# Patient Record
Sex: MALE | Race: WHITE | NOT HISPANIC OR LATINO | Employment: OTHER | URBAN - METROPOLITAN AREA
[De-identification: names, ages, dates, MRNs, and addresses within clinical notes are randomized per-mention and may not be internally consistent; named-entity substitution may affect disease eponyms.]

---

## 2022-04-27 DIAGNOSIS — D63.1 ANEMIA IN STAGE 5 CHRONIC KIDNEY DISEASE, NOT ON CHRONIC DIALYSIS: ICD-10-CM

## 2022-04-27 DIAGNOSIS — N18.5 ANEMIA IN STAGE 5 CHRONIC KIDNEY DISEASE, NOT ON CHRONIC DIALYSIS: ICD-10-CM

## 2022-05-03 ENCOUNTER — TELEPHONE (OUTPATIENT)
Dept: NEPHROLOGY | Facility: CLINIC | Age: 82
End: 2022-05-03
Payer: MEDICARE

## 2022-05-03 NOTE — TELEPHONE ENCOUNTER
Patients wife was contacted and he had an appt with Dr. Thompson today and would like to have his Kidneys check so he will go and have labs done tomorrow to check on GFR.     ----- Message from Pily Gudino sent at 5/3/2022  3:44 PM CDT -----  Contact: Ronda  Type: Needs Medical Advice    Who Called: Ronda, pt spouse  Best Call Back Number: [phone]  Additional  Information: PT wife would like for Josseline, to give her a call. States that it's very important  Please Advise- Thank you

## 2022-05-04 ENCOUNTER — OFFICE VISIT (OUTPATIENT)
Dept: NEPHROLOGY | Facility: CLINIC | Age: 82
End: 2022-05-04
Payer: MEDICARE

## 2022-05-04 VITALS
BODY MASS INDEX: 25.34 KG/M2 | HEART RATE: 72 BPM | DIASTOLIC BLOOD PRESSURE: 62 MMHG | OXYGEN SATURATION: 96 % | WEIGHT: 177 LBS | SYSTOLIC BLOOD PRESSURE: 152 MMHG | TEMPERATURE: 99 F | HEIGHT: 70 IN

## 2022-05-04 DIAGNOSIS — N25.81 SECONDARY HYPERPARATHYROIDISM OF RENAL ORIGIN: ICD-10-CM

## 2022-05-04 DIAGNOSIS — D63.1 ANEMIA IN STAGE 5 CHRONIC KIDNEY DISEASE, NOT ON CHRONIC DIALYSIS: ICD-10-CM

## 2022-05-04 DIAGNOSIS — N18.5 CKD (CHRONIC KIDNEY DISEASE), SYMPTOM MANAGEMENT ONLY, STAGE 5: Primary | ICD-10-CM

## 2022-05-04 DIAGNOSIS — Z20.822 ENCOUNTER FOR LABORATORY TESTING FOR COVID-19 VIRUS: ICD-10-CM

## 2022-05-04 DIAGNOSIS — I12.9 RENAL HYPERTENSION: ICD-10-CM

## 2022-05-04 DIAGNOSIS — E11.22 TYPE 2 DIABETES MELLITUS WITH STAGE 5 CHRONIC KIDNEY DISEASE NOT ON CHRONIC DIALYSIS, WITHOUT LONG-TERM CURRENT USE OF INSULIN: ICD-10-CM

## 2022-05-04 DIAGNOSIS — N04.9 NEPHROTIC SYNDROME: ICD-10-CM

## 2022-05-04 DIAGNOSIS — N18.5 TYPE 2 DIABETES MELLITUS WITH STAGE 5 CHRONIC KIDNEY DISEASE NOT ON CHRONIC DIALYSIS, WITHOUT LONG-TERM CURRENT USE OF INSULIN: ICD-10-CM

## 2022-05-04 DIAGNOSIS — N18.5 ANEMIA IN STAGE 5 CHRONIC KIDNEY DISEASE, NOT ON CHRONIC DIALYSIS: ICD-10-CM

## 2022-05-04 PROBLEM — E11.29 TYPE 2 DIABETES MELLITUS WITH KIDNEY COMPLICATION, WITHOUT LONG-TERM CURRENT USE OF INSULIN: Status: ACTIVE | Noted: 2022-05-04

## 2022-05-04 PROCEDURE — 99215 PR OFFICE/OUTPT VISIT, EST, LEVL V, 40-54 MIN: ICD-10-PCS | Mod: ,,, | Performed by: INTERNAL MEDICINE

## 2022-05-04 PROCEDURE — 99215 OFFICE O/P EST HI 40 MIN: CPT | Mod: ,,, | Performed by: INTERNAL MEDICINE

## 2022-05-04 RX ORDER — CARVEDILOL 25 MG/1
25 TABLET ORAL 2 TIMES DAILY
COMMUNITY
Start: 2021-12-28 | End: 2022-12-28

## 2022-05-04 RX ORDER — CLOPIDOGREL BISULFATE 75 MG/1
75 TABLET ORAL
COMMUNITY
Start: 2021-10-20

## 2022-05-04 RX ORDER — EPINEPHRINE 0.22MG
AEROSOL WITH ADAPTER (ML) INHALATION
COMMUNITY

## 2022-05-04 RX ORDER — CLONIDINE HYDROCHLORIDE 0.1 MG/1
0.1 TABLET ORAL 3 TIMES DAILY PRN
COMMUNITY
Start: 2021-12-28 | End: 2022-12-28

## 2022-05-04 RX ORDER — ALPHA LIPOIC ACID 300 MG
2 CAPSULE ORAL DAILY
COMMUNITY

## 2022-05-04 RX ORDER — FINASTERIDE 5 MG/1
5 TABLET, FILM COATED ORAL
COMMUNITY

## 2022-05-04 RX ORDER — ASPIRIN 81 MG/1
81 TABLET ORAL
COMMUNITY

## 2022-05-04 RX ORDER — DARBEPOETIN ALFA 60 UG/ML
60 SOLUTION INTRAVENOUS; SUBCUTANEOUS
COMMUNITY
Start: 2022-03-31

## 2022-05-04 RX ORDER — ACETAMINOPHEN 500 MG
1 TABLET ORAL DAILY
COMMUNITY

## 2022-05-04 RX ORDER — ISOSORBIDE MONONITRATE 60 MG/1
60 TABLET, EXTENDED RELEASE ORAL DAILY
COMMUNITY
Start: 2022-05-03 | End: 2023-04-28

## 2022-05-04 RX ORDER — CHOLECALCIFEROL (VITAMIN D3) 125 MCG
5000 CAPSULE ORAL
COMMUNITY

## 2022-05-04 RX ORDER — MAGNESIUM GLUCONATE 27.5 (500)
1 TABLET ORAL DAILY
COMMUNITY

## 2022-05-04 RX ORDER — FUROSEMIDE 40 MG/1
40 TABLET ORAL DAILY
COMMUNITY
Start: 2022-01-31

## 2022-05-04 RX ORDER — MULTIVITAMIN
1 TABLET ORAL DAILY
COMMUNITY

## 2022-05-04 RX ORDER — AMLODIPINE AND BENAZEPRIL HYDROCHLORIDE 10; 20 MG/1; MG/1
1 CAPSULE ORAL DAILY
COMMUNITY
Start: 2021-09-16

## 2022-05-04 RX ORDER — CARVEDILOL 6.25 MG/1
6.25 TABLET ORAL
COMMUNITY
End: 2022-05-04 | Stop reason: DRUGHIGH

## 2022-05-04 RX ORDER — TIZANIDINE 4 MG/1
4 TABLET ORAL
COMMUNITY
End: 2022-05-04 | Stop reason: ALTCHOICE

## 2022-05-04 NOTE — PROGRESS NOTES
Pt Name:  Gurmeet Nelson  Pt :  1940  Pt MRN:  [id number]    Date: 2022    Reason for visit:     Follow up visit for Chronic Kidney Disease.    Serum creatinine 5.04, GFR 10, CKD stage 5.    Chief Complaint:     The patient denies any complaints today.      Assessment & Plan:      Problem #1. Type 2 Diabetes Mellitus    Assessment:     Controlled with A1c 5.3%   Plan:    Diabetes managements and nutritional support as per Dr. Erick Galloway.    Repeat A1c from time to time as indicated.     Problem #2. Primary Hypertension    Assessment:    Controlled   Plan:    2 gm sodium diet, amlodipine-benazepril 10-20 mg daily, carvedilol 25 mg twice daily, clonidine 0.1 mg twice daily, furosemide 40 mg twice daily, isosorbide mononitrate 60 mg daily.     Problem #3. Chronic Kidney Disease Stage 5    Assessment:   With the need to start dialysis. The clopidogrel and aspirin will not allow a PD catheter placement at this time, even though that was his preferred modality.       Plan:    Chest X-ray, COVID 19 testing, Hepatitis panel    Refer to [facility] dialysis center     Problem #4. Anemia in Chronic Kidney Disease    Assessment:     Complicated by iron deficiency   Plan:    Continue synthetic erythropoietin     Iron load with iron sucrose 100 mg x 10 dialysis treatments after starting dialysis     Problem #5. Secondary Hyperthyroidism of Chronic Kidney Disease    Assessment:     Needful of continued address   Plan:    Repeat calcium, phosphorus, albumin, PTH level from time to time as indicated.     Problem #6. Hyperkalemia    Assessment:     Treated   Plan:   Begin hemodialysis     Repeat potassium level as indicated         HPI:     This is the [facility]st [facility] visit, with this provider, for this 81 year old man referred by Dr. Galloway on 2017 for further evaluation of Chronic Kidney Disease Stage 4.    He has recently begun to experience increasing  dyspnea with minimal exertion, and a sensation of chest heaviness for which he had the dose of the isosorbide uintamononitrate increased by Dr. Thompson yesterday to 60 mg.    He and his wife were planning to [facility] [place], but thought that he needed to be seen today for an assessment before so doing.     In the clinic today for follow up of the status of his kidney function, he has less energy, is more dyspneic with less exertion, has the aforementioned chest heaviness, and continues significant edema requiring wound wraps. He does not added of absent appetite, nausea, shortness of breath awakening him from sleep at night, absent energy, or difficulty with thought formulation or expression. He continues to have some edema.          From the standpoint of risk factors for the development of kidney disease, he has had his Type 2 diabetes mellitus for at least 17 years, if not more.  That he is aware, he has probably had his high blood pressure for at least that long as well.  That he is aware, there is also no significant family history of kidney problems    History:     Past Medical History:   Diagnosis Date    Chronic kidney disease (CKD)     Coronary artery disease     Essential (primary) hypertension     Kidney disease due to secondary diabetes mellitus     Type 2 diabetes mellitus without complications      Past Surgical History:   Procedure Laterality Date    AV FISTULA PLACEMENT Right 05/10/2021    CARDIAC PACEMAKER PLACEMENT      COLONOSCOPY      CORONARY ANGIOPLASTY WITH STENT PLACEMENT       Family History   Problem Relation Age of Onset    Cancer Mother     Heart disease Father     Heart disease Sister     Heart disease Brother      Social History     Substance and Sexual Activity   Alcohol Use Not Currently     Social History     Substance and Sexual Activity   Drug Use Never     Social History     Substance and Sexual Activity   Sexual Activity Not on file      has no history on file for sexual activity.  Social History     Tobacco Use   Smoking Status Never Smoker   Smokeless Tobacco Never Used       Allergies:    Review of patient's allergies indicates:  No Known Allergies      Current Outpatient Medications:     alpha lipoic acid 300 mg Cap, Take 2 capsules by mouth once daily., Disp: , Rfl:     amlodipine-benazepril 10-20mg (LOTREL) 10-20 mg per capsule, Take 1 capsule by mouth once daily., Disp: , Rfl:     aspirin (ECOTRIN) 81 MG EC tablet, Take 81 mg by mouth., Disp: , Rfl:     carvediloL (COREG) 25 MG tablet, Take 25 mg by mouth 2 (two) times a day., Disp: , Rfl:     cholecalciferol, vitamin D3, 125 mcg (5,000 unit) capsule, Take 5,000 Units by mouth., Disp: , Rfl:     cloNIDine (CATAPRES) 0.1 MG tablet, Take 0.1 mg by mouth 3 (three) times daily as needed., Disp: , Rfl:     clopidogreL (PLAVIX) 75 mg tablet, Take 75 mg by mouth., Disp: , Rfl:     coenzyme Q10 100 mg capsule, Take by mouth., Disp: , Rfl:     darbepoetin nazario in polysorbat (ARANESP, IN POLYSORBATE,) 60 mcg/mL Soln, Inject 60 mcg into the skin., Disp: , Rfl:     ferrous sulfate 27 mg iron Tab, Take 27 mg by mouth 2 (two) times daily., Disp: , Rfl:     finasteride (PROSCAR) 5 mg tablet, Take 5 mg by mouth., Disp: , Rfl:     furosemide (LASIX) 40 MG tablet, Take 40 mg by mouth once daily., Disp: , Rfl:     isosorbide mononitrate (IMDUR) 60 MG 24 hr tablet, Take 60 mg by mouth once daily., Disp: , Rfl:     krill-om-3-dha-epa-phospho-ast 460-716-92-64 mg Cap, Take 1 capsule by mouth once daily., Disp: , Rfl:     levocarnitine HCl (ACETYL-L-CARNITINE MISC), 1,000 mg by Misc.(Non-Drug; Combo Route) route., Disp: , Rfl:     magnesium gluconate 27.5 mg magne- sium (500 mg) Tab, Take 1 tablet by mouth once daily., Disp: , Rfl:     multivitamin (THERAGRAN) per tablet, Take 1 tablet by mouth once daily., Disp: , Rfl:     sodium zirconium cyclosilicate (LOKELMA) 10 gram packet, Take 10 g by  "mouth every other day., Disp: , Rfl:     tiZANidine (ZANAFLEX) 4 MG tablet, Take 4 mg by mouth., Disp: , Rfl:     ROS:     Constitutional:  Denies fever or chills   Eyes:  Denies change in visual acuity   HENT:  Denies nasal congestion or sore throat   Respiratory:  As in the history of the present illness.   Cardiovascular:  As in the history of the present illness.   GI:  As in the history of the present illness.    Musculoskeletal:  Denies back pain or joint pain   Integument:  Denies rash   Neurologic:  Denies headache, focal weakness or sensory changes   Endocrine:  Denies polyuria or polydipsia   Lymphatic:  Denies swollen glands   Psychiatric:  Denies depression or anxiety    Physical Exam:     Vitals:   Vitals:    05/04/22 1341   BP: (!) 152/62   Pulse: 72   Temp: 98.9 °F (37.2 °C)   SpO2: 96%   Weight: 80.3 kg (177 lb)   Height: 5' 10" (1.778 m)     Body mass index is 25.4 kg/m².    Constitutional:  Well developed, well nourished, and in no acute distress   Eyes:  PERRLA, conjunctiva pale   HENT:  Atraumatic, external ears normal, nose normal.  Neck: There is no jugular venous distension or thyromegaly.   Respiratory:  No respiratory distress, normal breath sounds, no rales, no wheezing   Cardiovascular:  Normal rate,and a regular rhythm, 1-2/6 JESUS at the apex, no gallops, no rub, and 1 + pretibial edema bilaterally despite wound wraps  GI:  Normal bowel sounds.  Musculoskeletal:  No deformities.   Neurologic:  Alert & oriented x 3, CN 2-12 normal, normal motor function, and no asterixis.   Psychiatric:  Speech and behavior appropriate.      Labs/Tests:    Urine Protein/Creatinine Ratio <0.2 mg of protein/mg of creatinine 5.6 High      Ferritin 10.5 - 307.3 ng/mL 126.1     Component Ref Range & Units 08:53 3 mo ago   Iron 50 - 175 ug/dL 32 Low   31 Low     TIBC 250 - 425 ug/dL 235 Low   245 Low     Iron Saturation 15 - 20 % 14 Low   13 Low                Hemoglobin 11.9 - 16.8 g/dL 7.9 Low      PTH, Intact " 15 - 65 pg/mL 309 High      odium 136 - 147 mmol/L 139    Potassium 3.5 - 5.1 mmol/L 4.6    Chloride 98 - 107 mmol/L 109 High     CO2 20 - 31 mmol/L 19 Low     Glucose 70 - 99 mg/dL 141 High     BUN 9 - 23 mg/dL 72 High     Creatinine 0.70 - 1.30 mg/dL 5.04 High     Calcium 8.3 - 10.6 mg/dL 9.1    Phosphorus Level 2.4 - 5.1 mg/dL 5.0    Albumin Level 3.2 - 4.8 g/dL 3.8    eGFR >90 mL/min/1.73m2 10 Low            Follow up:    In the [facility] [place].         This note was created using the voice recognition software currently available to the Medical Staff of the [facility] and its health care facilities. All of the best efforts undertaken to edit the product of that use shall necessarily fall short from time to time. Viewers and reviewers of the product of its use are encouraged to contact this provider for clarification when, and if, the product message is unclear.

## 2022-05-04 NOTE — PATIENT INSTRUCTIONS
The patient has been provided with their current level of kidney function including eGFR and creatinine.  Preparations for his starting dialysis tomorrow at the [facility] in [place] are underway.

## 2022-05-05 ENCOUNTER — OUTSIDE PLACE OF SERVICE (OUTPATIENT)
Dept: NEPHROLOGY | Facility: CLINIC | Age: 82
End: 2022-05-05
Payer: MEDICARE

## 2022-05-05 DIAGNOSIS — Z11.59 NEED FOR HEPATITIS B SCREENING TEST: Primary | ICD-10-CM

## 2022-05-10 ENCOUNTER — OUTSIDE PLACE OF SERVICE (OUTPATIENT)
Dept: NEPHROLOGY | Facility: CLINIC | Age: 82
End: 2022-05-10
Payer: MEDICARE

## 2022-05-13 ENCOUNTER — TELEPHONE (OUTPATIENT)
Dept: NEPHROLOGY | Facility: CLINIC | Age: 82
End: 2022-05-13
Payer: MEDICARE

## 2022-05-13 NOTE — TELEPHONE ENCOUNTER
TC to patients wife and she states she spoke to Dr. Thompson's office and they gave her a verbal approval to proceed with being able to hold the Plavix in order to get a PD cath placed.  Originally patient was told he had to wait six months after his heart stents being on Plavix before he could be able to hold the Plavix for PD cath placement.  Stents were placed 12/14/21.  Patients wife has scheduled an appointment with Dr. Hicks for PD cath placement evaluation on 5/27/22.  I explained to her that we will need a clearance in writing from Dr. Thompson's office.  She voices understanding.     Dr. Thompson's office contacted and spoke with Eri, his nurse.  She will fax to our office clearance to proceed with PD cath prior to full 6 month time frame.     [facility] PD dept. Made aware.                                     ----- Message from Nisha Bee RN sent at 5/12/2022 11:52 AM CDT -----  Contact: pt wife    ----- Message -----  From: Jackie Bagley  Sent: 5/12/2022  11:06 AM CDT  To: [facility] Staff    Type: Needs Medical Advice    Who Called: pt wife   Best Call Back Number: [phone]  Inquiry/Question: calling to speak with provider about issues with dialysis, please advise pt  Thank you~

## 2022-05-17 ENCOUNTER — OUTSIDE PLACE OF SERVICE (OUTPATIENT)
Dept: NEPHROLOGY | Facility: CLINIC | Age: 82
End: 2022-05-17
Payer: MEDICARE

## 2022-05-24 ENCOUNTER — OUTSIDE PLACE OF SERVICE (OUTPATIENT)
Dept: NEPHROLOGY | Facility: CLINIC | Age: 82
End: 2022-05-24

## 2022-06-01 ENCOUNTER — OUTSIDE PLACE OF SERVICE (OUTPATIENT)
Dept: NEPHROLOGY | Facility: CLINIC | Age: 82
End: 2022-06-01
Payer: MEDICARE

## 2022-06-01 PROCEDURE — 90960 PR ESRD SERVICES, PER MONTH, 20+ YR OLD, 4+ VISITS: ICD-10-PCS | Mod: ,,, | Performed by: INTERNAL MEDICINE

## 2022-06-01 PROCEDURE — 90960 ESRD SRV 4 VISITS P MO 20+: CPT | Mod: ,,, | Performed by: INTERNAL MEDICINE

## 2022-07-01 ENCOUNTER — OUTSIDE PLACE OF SERVICE (OUTPATIENT)
Dept: NEPHROLOGY | Facility: CLINIC | Age: 82
End: 2022-07-01
Payer: MEDICARE

## 2022-07-01 PROCEDURE — 90961 PR ESRD SERVICES, PER MONTH, 20+ YR OLD, 2-3 VISITS: ICD-10-PCS | Mod: ,,, | Performed by: INTERNAL MEDICINE

## 2022-07-01 PROCEDURE — 90961 ESRD SRV 2-3 VSTS P MO 20+: CPT | Mod: ,,, | Performed by: INTERNAL MEDICINE

## 2022-08-01 ENCOUNTER — OUTSIDE PLACE OF SERVICE (OUTPATIENT)
Dept: NEPHROLOGY | Facility: CLINIC | Age: 82
End: 2022-08-01
Payer: MEDICARE

## 2022-08-01 PROCEDURE — 90966 ESRD HOME PT SERV P MO 20+: CPT | Mod: ,,, | Performed by: INTERNAL MEDICINE

## 2022-08-01 PROCEDURE — 90966 PR ESRD SERVICES, HOME DIALYSIS, PER MONTH, 20+ YR OLD: ICD-10-PCS | Mod: ,,, | Performed by: INTERNAL MEDICINE

## 2022-10-01 ENCOUNTER — OUTSIDE PLACE OF SERVICE (OUTPATIENT)
Dept: NEPHROLOGY | Facility: CLINIC | Age: 82
End: 2022-10-01
Payer: MEDICARE

## 2022-10-01 PROCEDURE — 90966 PR ESRD SERVICES, HOME DIALYSIS, PER MONTH, 20+ YR OLD: ICD-10-PCS | Mod: ,,, | Performed by: INTERNAL MEDICINE

## 2022-10-01 PROCEDURE — 90966 ESRD HOME PT SERV P MO 20+: CPT | Mod: ,,, | Performed by: INTERNAL MEDICINE

## 2022-11-01 ENCOUNTER — OUTSIDE PLACE OF SERVICE (OUTPATIENT)
Dept: NEPHROLOGY | Facility: CLINIC | Age: 82
End: 2022-11-01
Payer: MEDICARE

## 2022-11-01 PROCEDURE — 90966 PR ESRD SERVICES, HOME DIALYSIS, PER MONTH, 20+ YR OLD: ICD-10-PCS | Mod: ,,, | Performed by: INTERNAL MEDICINE

## 2022-11-01 PROCEDURE — 90966 ESRD HOME PT SERV P MO 20+: CPT | Mod: ,,, | Performed by: INTERNAL MEDICINE

## 2022-12-01 ENCOUNTER — OUTSIDE PLACE OF SERVICE (OUTPATIENT)
Dept: NEPHROLOGY | Facility: CLINIC | Age: 82
End: 2022-12-01
Payer: MEDICARE

## 2022-12-01 PROCEDURE — 90966 ESRD HOME PT SERV P MO 20+: CPT | Mod: ,,, | Performed by: INTERNAL MEDICINE

## 2022-12-01 PROCEDURE — 90966 PR ESRD SERVICES, HOME DIALYSIS, PER MONTH, 20+ YR OLD: ICD-10-PCS | Mod: ,,, | Performed by: INTERNAL MEDICINE

## 2023-01-01 ENCOUNTER — OUTSIDE PLACE OF SERVICE (OUTPATIENT)
Dept: NEPHROLOGY | Facility: CLINIC | Age: 83
End: 2023-01-01
Payer: MEDICARE

## 2023-01-01 PROCEDURE — 90966 ESRD HOME PT SERV P MO 20+: CPT | Mod: ,,, | Performed by: INTERNAL MEDICINE

## 2023-01-01 PROCEDURE — 90966 PR ESRD SERVICES, HOME DIALYSIS, PER MONTH, 20+ YR OLD: ICD-10-PCS | Mod: ,,, | Performed by: INTERNAL MEDICINE

## 2023-02-01 ENCOUNTER — OUTSIDE PLACE OF SERVICE (OUTPATIENT)
Dept: NEPHROLOGY | Facility: CLINIC | Age: 83
End: 2023-02-01
Payer: MEDICARE

## 2023-02-01 PROCEDURE — 90966 ESRD HOME PT SERV P MO 20+: CPT | Mod: ,,, | Performed by: INTERNAL MEDICINE

## 2023-02-01 PROCEDURE — 90966 PR ESRD SERVICES, HOME DIALYSIS, PER MONTH, 20+ YR OLD: ICD-10-PCS | Mod: ,,, | Performed by: INTERNAL MEDICINE

## 2023-03-01 ENCOUNTER — OUTSIDE PLACE OF SERVICE (OUTPATIENT)
Dept: NEPHROLOGY | Facility: CLINIC | Age: 83
End: 2023-03-01
Payer: MEDICARE

## 2023-03-01 PROCEDURE — 90966 ESRD HOME PT SERV P MO 20+: CPT | Mod: ,,, | Performed by: INTERNAL MEDICINE

## 2023-03-01 PROCEDURE — 90966 PR ESRD SERVICES, HOME DIALYSIS, PER MONTH, 20+ YR OLD: ICD-10-PCS | Mod: ,,, | Performed by: INTERNAL MEDICINE

## 2023-04-01 ENCOUNTER — OUTSIDE PLACE OF SERVICE (OUTPATIENT)
Dept: NEPHROLOGY | Facility: CLINIC | Age: 83
End: 2023-04-01
Payer: MEDICARE

## 2023-04-01 PROCEDURE — 90966 PR ESRD SERVICES, HOME DIALYSIS, PER MONTH, 20+ YR OLD: ICD-10-PCS | Mod: ,,, | Performed by: INTERNAL MEDICINE

## 2023-04-01 PROCEDURE — 90966 ESRD HOME PT SERV P MO 20+: CPT | Mod: ,,, | Performed by: INTERNAL MEDICINE

## 2023-05-01 ENCOUNTER — OUTSIDE PLACE OF SERVICE (OUTPATIENT)
Dept: NEPHROLOGY | Facility: CLINIC | Age: 83
End: 2023-05-01
Payer: MEDICARE

## 2023-05-01 PROCEDURE — 90966 ESRD HOME PT SERV P MO 20+: CPT | Mod: ,,, | Performed by: INTERNAL MEDICINE

## 2023-05-01 PROCEDURE — 90966 PR ESRD SERVICES, HOME DIALYSIS, PER MONTH, 20+ YR OLD: ICD-10-PCS | Mod: ,,, | Performed by: INTERNAL MEDICINE

## 2023-06-01 ENCOUNTER — OUTSIDE PLACE OF SERVICE (OUTPATIENT)
Dept: NEPHROLOGY | Facility: CLINIC | Age: 83
End: 2023-06-01
Payer: MEDICARE

## 2023-06-01 PROCEDURE — 90966 ESRD HOME PT SERV P MO 20+: CPT | Mod: ,,, | Performed by: INTERNAL MEDICINE

## 2023-06-01 PROCEDURE — 90966 PR ESRD SERVICES, HOME DIALYSIS, PER MONTH, 20+ YR OLD: ICD-10-PCS | Mod: ,,, | Performed by: INTERNAL MEDICINE

## 2023-07-01 ENCOUNTER — OUTSIDE PLACE OF SERVICE (OUTPATIENT)
Dept: NEPHROLOGY | Facility: CLINIC | Age: 83
End: 2023-07-01
Payer: MEDICARE

## 2023-07-01 PROCEDURE — 90966 PR ESRD SERVICES, HOME DIALYSIS, PER MONTH, 20+ YR OLD: ICD-10-PCS | Mod: ,,, | Performed by: INTERNAL MEDICINE

## 2023-07-01 PROCEDURE — 90966 ESRD HOME PT SERV P MO 20+: CPT | Mod: ,,, | Performed by: INTERNAL MEDICINE

## 2023-08-01 ENCOUNTER — OUTSIDE PLACE OF SERVICE (OUTPATIENT)
Dept: NEPHROLOGY | Facility: CLINIC | Age: 83
End: 2023-08-01
Payer: MEDICARE

## 2023-08-01 PROCEDURE — 90966 ESRD HOME PT SERV P MO 20+: CPT | Mod: ,,, | Performed by: INTERNAL MEDICINE

## 2023-08-01 PROCEDURE — 90966 PR ESRD SERVICES, HOME DIALYSIS, PER MONTH, 20+ YR OLD: ICD-10-PCS | Mod: ,,, | Performed by: INTERNAL MEDICINE

## 2023-09-01 ENCOUNTER — OUTSIDE PLACE OF SERVICE (OUTPATIENT)
Dept: NEPHROLOGY | Facility: CLINIC | Age: 83
End: 2023-09-01
Payer: MEDICARE

## 2023-09-01 PROCEDURE — 90966 ESRD HOME PT SERV P MO 20+: CPT | Mod: ,,, | Performed by: INTERNAL MEDICINE

## 2023-09-01 PROCEDURE — 90966 PR ESRD SERVICES, HOME DIALYSIS, PER MONTH, 20+ YR OLD: ICD-10-PCS | Mod: ,,, | Performed by: INTERNAL MEDICINE

## 2023-10-01 ENCOUNTER — OUTSIDE PLACE OF SERVICE (OUTPATIENT)
Dept: NEPHROLOGY | Facility: CLINIC | Age: 83
End: 2023-10-01
Payer: MEDICARE

## 2023-10-01 PROCEDURE — 90966 PR ESRD SERVICES, HOME DIALYSIS, PER MONTH, 20+ YR OLD: ICD-10-PCS | Mod: ,,, | Performed by: INTERNAL MEDICINE

## 2023-10-01 PROCEDURE — 90966 ESRD HOME PT SERV P MO 20+: CPT | Mod: ,,, | Performed by: INTERNAL MEDICINE

## 2023-11-01 PROCEDURE — 90966 ESRD HOME PT SERV P MO 20+: CPT | Mod: ,,, | Performed by: INTERNAL MEDICINE

## 2023-11-01 PROCEDURE — 90966 PR ESRD SERVICES, HOME DIALYSIS, PER MONTH, 20+ YR OLD: ICD-10-PCS | Mod: ,,, | Performed by: INTERNAL MEDICINE

## 2023-11-09 ENCOUNTER — OUTSIDE PLACE OF SERVICE (OUTPATIENT)
Dept: NEPHROLOGY | Facility: CLINIC | Age: 83
End: 2023-11-09
Payer: MEDICARE

## 2023-12-01 PROCEDURE — 90966 ESRD HOME PT SERV P MO 20+: CPT | Mod: ,,, | Performed by: INTERNAL MEDICINE

## 2023-12-05 ENCOUNTER — OUTSIDE PLACE OF SERVICE (OUTPATIENT)
Dept: NEPHROLOGY | Facility: CLINIC | Age: 83
End: 2023-12-05
Payer: MEDICARE

## 2023-12-05 PROCEDURE — 99222 1ST HOSP IP/OBS MODERATE 55: CPT | Mod: ,,, | Performed by: INTERNAL MEDICINE

## 2023-12-06 PROCEDURE — 99232 SBSQ HOSP IP/OBS MODERATE 35: CPT | Mod: ,,, | Performed by: INTERNAL MEDICINE

## 2023-12-08 PROCEDURE — 99232 SBSQ HOSP IP/OBS MODERATE 35: CPT | Mod: ,,, | Performed by: INTERNAL MEDICINE

## 2023-12-14 ENCOUNTER — OUTSIDE PLACE OF SERVICE (OUTPATIENT)
Dept: NEPHROLOGY | Facility: CLINIC | Age: 83
End: 2023-12-14
Payer: MEDICARE